# Patient Record
Sex: MALE | Race: WHITE | NOT HISPANIC OR LATINO | Employment: OTHER | URBAN - METROPOLITAN AREA
[De-identification: names, ages, dates, MRNs, and addresses within clinical notes are randomized per-mention and may not be internally consistent; named-entity substitution may affect disease eponyms.]

---

## 2022-06-28 ENCOUNTER — OUTSIDE FACILITY SERVICE (OUTPATIENT)
Dept: CARDIOLOGY | Facility: CLINIC | Age: 72
End: 2022-06-28

## 2022-06-28 PROCEDURE — 99222 1ST HOSP IP/OBS MODERATE 55: CPT | Performed by: INTERNAL MEDICINE

## 2022-08-03 NOTE — PROGRESS NOTES
Patient: Say Coley    YOB: 1950    Date: 08/04/2022    Primary Care Provider: Juan Pride MD    Chief Complaint   Patient presents with   • Cholecystitis        SUBJECTIVE:    History of present illness: Patient was seen in the emergency room about 6 weeks ago for abdominal pain.  He points to the right lower abdomen and periumbilical region.  At that time he had nausea and vomiting.  Currently still having some intermittent right lower abdominal pain some nausea but no vomiting.  The following portions of the patient's history were reviewed and updated as appropriate: allergies, current medications, past family history, past medical history, past social history, past surgical history and problem list.    Review of Systems   Constitutional: Negative for activity change, chills, fever and unexpected weight change.   HENT: Negative for hearing loss, trouble swallowing and voice change.    Eyes: Negative for visual disturbance.   Respiratory: Negative for apnea, cough, chest tightness, shortness of breath and wheezing.    Cardiovascular: Negative for chest pain, palpitations and leg swelling.   Gastrointestinal: Positive for abdominal pain and nausea. Negative for abdominal distention, anal bleeding, blood in stool, constipation, diarrhea, rectal pain and vomiting.   Endocrine: Negative for cold intolerance and heat intolerance.   Genitourinary: Negative for difficulty urinating, dysuria and flank pain.   Musculoskeletal: Negative for back pain and gait problem.   Skin: Negative for color change, rash and wound.   Neurological: Negative for dizziness, syncope, speech difficulty, weakness, light-headedness, numbness and headaches.   Hematological: Negative for adenopathy. Does not bruise/bleed easily.   Psychiatric/Behavioral: Negative for confusion. The patient is not nervous/anxious.        History:  History reviewed. No pertinent past medical history.    History reviewed. No pertinent  "surgical history.    History reviewed. No pertinent family history.    Social History     Tobacco Use   • Smoking status: Former Smoker     Packs/day: 1.50     Years: 30.00     Pack years: 45.00   • Smokeless tobacco: Never Used   Vaping Use   • Vaping Use: Never used   Substance Use Topics   • Alcohol use: Yes     Comment: Social drinker   • Drug use: Never       Allergies:  No Known Allergies    Medications:    Current Outpatient Medications:   •  amiodarone (PACERONE) 200 MG tablet, , Disp: , Rfl:   •  buPROPion XL (WELLBUTRIN XL) 150 MG 24 hr tablet, TAKE 1 TABLET BY MOUTH EVERY DAY IN THE MORNING FOR 30 DAYS, Disp: , Rfl:   •  cloNIDine (CATAPRES) 0.1 MG tablet, TAKE 1 TABLET BY MOUTH EVERY 8 HOURS AS NEEDED IF SBP > 150 30, Disp: , Rfl:   •  digoxin (LANOXIN) 250 MCG tablet, TAKE 1 TABLET BY MOUTH EVERY DAY FOR 30 DAYS, Disp: , Rfl:   •  doxazosin (CARDURA) 4 MG tablet, , Disp: , Rfl:   •  folic acid (FOLVITE) 400 MCG tablet, Take 400 mcg by mouth Daily., Disp: , Rfl:   •  HYDROcodone-acetaminophen (NORCO)  MG per tablet, TAKE 1 TABLET BY MOUTH EVERY 6 HOURS AS NEEDED FOR 30 DAYS, Disp: , Rfl:   •  Metoprolol Tartrate 75 MG tablet, Take 75 mg by mouth., Disp: , Rfl:   •  prednisoLONE acetate (PRED FORTE) 1 % ophthalmic suspension, if needed., Disp: , Rfl:   •  ramipril (ALTACE) 10 MG capsule, TAKE 1 CAPSULE BY MOUTH EVERY DAY FOR 30 DAYS, Disp: , Rfl:   •  rosuvastatin (CRESTOR) 20 MG tablet, Take 20 mg by mouth Daily., Disp: , Rfl:   •  Xarelto 20 MG tablet, , Disp: , Rfl:     OBJECTIVE:    Vital Signs:   Vitals:    08/04/22 1354   BP: 132/76   BP Location: Right arm   Patient Position: Sitting   Cuff Size: Adult   Pulse: 74   Temp: 97.4 °F (36.3 °C)   TempSrc: Temporal   SpO2: 94%   Weight: 109 kg (241 lb)   Height: 182.9 cm (72\")       Physical Exam:   General Appearance:    Alert, cooperative, in no acute distress   Head:    Normocephalic, without obvious abnormality, atraumatic   Eyes:            " Normal.  No scleral icterus.  PERRLA    Lungs:     Clear to auscultation,respirations regular, even and                  unlabored    Heart:    Regular rhythm and normal rate, normal S1 and S2, no            murmur   Abdomen:    Soft nondistended with some mild tenderness in the right lower abdomen.  No hepatosplenomegaly.   Extremities:   Moves all extremities well, no edema, no cyanosis, no             redness   Skin:   No bleeding, bruising or rash   Neurologic:   Normal without gross deficits.   Psychiatric: No evidence of depression or anxiety        Results Review:   I reviewed the patient's new clinical results.  Labs and CT were reviewed    Review of Systems was reviewed and confirmed as accurate as documented by the MA.    ASSESSMENT/PLAN:    1. Calculus of gallbladder without cholecystitis without obstruction        Certainly findings on the CT scan were suggestive of cholecystitis and although I am not convinced all of his symptoms are related to the gallbladder I do believe he is having continued issues.  I recommend a cholecystectomy.  I offered the patient a laparoscopic cholecystectomy.  I explained this procedure to them in detail and they understand the procedure.  They also understand the risks of bleeding, infection, bile leak, ductal injury, bowel injury, the possibility of converting to an open procedure etc. They understand all of these risks and I have answered all of their questions and they wish to proceed.    Electronically signed by Trevor Hassan MD  08/04/22

## 2022-08-04 ENCOUNTER — OFFICE VISIT (OUTPATIENT)
Dept: SURGERY | Facility: CLINIC | Age: 72
End: 2022-08-04

## 2022-08-04 VITALS
SYSTOLIC BLOOD PRESSURE: 132 MMHG | BODY MASS INDEX: 32.64 KG/M2 | WEIGHT: 241 LBS | TEMPERATURE: 97.4 F | OXYGEN SATURATION: 94 % | HEART RATE: 74 BPM | HEIGHT: 72 IN | DIASTOLIC BLOOD PRESSURE: 76 MMHG

## 2022-08-04 DIAGNOSIS — K80.20 CALCULUS OF GALLBLADDER WITHOUT CHOLECYSTITIS WITHOUT OBSTRUCTION: Primary | ICD-10-CM

## 2022-08-04 PROCEDURE — 99204 OFFICE O/P NEW MOD 45 MIN: CPT | Performed by: SURGERY

## 2022-08-04 RX ORDER — ROSUVASTATIN CALCIUM 20 MG/1
20 TABLET, COATED ORAL DAILY
COMMUNITY
Start: 2022-07-13

## 2022-08-04 RX ORDER — CLONIDINE HYDROCHLORIDE 0.1 MG/1
TABLET ORAL
COMMUNITY
Start: 2022-06-02

## 2022-08-04 RX ORDER — AMIODARONE HYDROCHLORIDE 200 MG/1
100 TABLET ORAL DAILY
COMMUNITY
Start: 2022-07-21

## 2022-08-04 RX ORDER — HYDROCODONE BITARTRATE AND ACETAMINOPHEN 10; 325 MG/1; MG/1
TABLET ORAL
COMMUNITY
Start: 2022-07-08

## 2022-08-04 RX ORDER — METOPROLOL TARTRATE 75 MG/1
75 TABLET, FILM COATED ORAL
COMMUNITY
Start: 2022-06-21 | End: 2022-09-19

## 2022-08-04 RX ORDER — RAMIPRIL 10 MG/1
CAPSULE ORAL
COMMUNITY
Start: 2022-05-10

## 2022-08-04 RX ORDER — DOXAZOSIN MESYLATE 4 MG/1
4 TABLET ORAL NIGHTLY
COMMUNITY
Start: 2022-03-11

## 2022-08-04 RX ORDER — PREDNISOLONE ACETATE 10 MG/ML
SUSPENSION/ DROPS OPHTHALMIC 2 TIMES DAILY PRN
COMMUNITY
Start: 2022-03-15

## 2022-08-04 RX ORDER — BUPROPION HYDROCHLORIDE 150 MG/1
150 TABLET ORAL EVERY MORNING
COMMUNITY
Start: 2022-06-07

## 2022-08-04 RX ORDER — DIGOXIN 250 MCG
TABLET ORAL
COMMUNITY
Start: 2022-06-07

## 2022-08-04 RX ORDER — LANOLIN ALCOHOL/MO/W.PET/CERES
400 CREAM (GRAM) TOPICAL DAILY
COMMUNITY
Start: 2022-06-28

## 2022-08-04 RX ORDER — RIVAROXABAN 20 MG/1
20 TABLET, FILM COATED ORAL
COMMUNITY
Start: 2022-05-27

## 2022-08-05 ENCOUNTER — TELEPHONE (OUTPATIENT)
Dept: SURGERY | Facility: CLINIC | Age: 72
End: 2022-08-05

## 2022-08-05 NOTE — TELEPHONE ENCOUNTER
Called spoke to patient wife advised that we would have to move surgery to [place], advised date for pre admission testing as well as the time, patient was advised per dr. hercules to stop xarelto 3 days prior to surgery.

## 2022-08-05 NOTE — TELEPHONE ENCOUNTER
Contacted patient cardiologist for cardiac clearance letter prior to surgery, pending fax at this time.

## 2022-08-05 NOTE — TELEPHONE ENCOUNTER
"Pt's wife called, she stated \"I am calling to see if they figured-out where Say is going to have his surgery done and when it will be done.\"  Please call Mrs. Coley.  "

## 2022-08-09 PROBLEM — K80.20 CALCULUS OF GALLBLADDER WITHOUT CHOLECYSTITIS WITHOUT OBSTRUCTION: Status: ACTIVE | Noted: 2022-08-09

## 2022-08-09 RX ORDER — SODIUM CHLORIDE 9 MG/ML
100 INJECTION, SOLUTION INTRAVENOUS CONTINUOUS
Status: CANCELLED | OUTPATIENT
Start: 2022-08-09

## 2022-08-11 ENCOUNTER — TELEPHONE (OUTPATIENT)
Dept: SURGERY | Facility: CLINIC | Age: 72
End: 2022-08-11

## 2022-08-11 ENCOUNTER — TELEPHONE (OUTPATIENT)
Dept: PREADMISSION TESTING | Facility: HOSPITAL | Age: 72
End: 2022-08-11

## 2022-08-11 NOTE — TELEPHONE ENCOUNTER
M FOR PATIENT TO CALL AND CONFIRM THE PROCEDURE ON 08/16/2022 @ [facility].

## 2022-08-12 ENCOUNTER — PRE-ADMISSION TESTING (OUTPATIENT)
Dept: PREADMISSION TESTING | Facility: HOSPITAL | Age: 72
End: 2022-08-12

## 2022-08-12 VITALS — HEIGHT: 72 IN | BODY MASS INDEX: 33.13 KG/M2 | WEIGHT: 244.6 LBS

## 2022-08-12 LAB
ANION GAP SERPL CALCULATED.3IONS-SCNC: 10.2 MMOL/L (ref 5–15)
BUN SERPL-MCNC: 20 MG/DL (ref 8–23)
BUN/CREAT SERPL: 26 (ref 7–25)
CALCIUM SPEC-SCNC: 9.5 MG/DL (ref 8.6–10.5)
CHLORIDE SERPL-SCNC: 98 MMOL/L (ref 98–107)
CO2 SERPL-SCNC: 27.8 MMOL/L (ref 22–29)
CREAT SERPL-MCNC: 0.77 MG/DL (ref 0.76–1.27)
DEPRECATED RDW RBC AUTO: 45.7 FL (ref 37–54)
EGFRCR SERPLBLD CKD-EPI 2021: 95.7 ML/MIN/1.73
ERYTHROCYTE [DISTWIDTH] IN BLOOD BY AUTOMATED COUNT: 13.8 % (ref 12.3–15.4)
GLUCOSE SERPL-MCNC: 165 MG/DL (ref 65–99)
HCT VFR BLD AUTO: 40.1 % (ref 37.5–51)
HGB BLD-MCNC: 13.8 G/DL (ref 13–17.7)
MCH RBC QN AUTO: 31.2 PG (ref 26.6–33)
MCHC RBC AUTO-ENTMCNC: 34.4 G/DL (ref 31.5–35.7)
MCV RBC AUTO: 90.7 FL (ref 79–97)
PLATELET # BLD AUTO: 229 10*3/MM3 (ref 140–450)
PMV BLD AUTO: 10.4 FL (ref 6–12)
POTASSIUM SERPL-SCNC: 4.6 MMOL/L (ref 3.5–5.2)
QT INTERVAL: 390 MS
QTC INTERVAL: 423 MS
RBC # BLD AUTO: 4.42 10*6/MM3 (ref 4.14–5.8)
SARS-COV-2 RNA PNL SPEC NAA+PROBE: NOT DETECTED
SODIUM SERPL-SCNC: 136 MMOL/L (ref 136–145)
WBC NRBC COR # BLD: 10.79 10*3/MM3 (ref 3.4–10.8)

## 2022-08-12 PROCEDURE — 93005 ELECTROCARDIOGRAM TRACING: CPT

## 2022-08-12 PROCEDURE — 80048 BASIC METABOLIC PNL TOTAL CA: CPT

## 2022-08-12 PROCEDURE — U0004 COV-19 TEST NON-CDC HGH THRU: HCPCS

## 2022-08-12 PROCEDURE — C9803 HOPD COVID-19 SPEC COLLECT: HCPCS

## 2022-08-12 PROCEDURE — 36415 COLL VENOUS BLD VENIPUNCTURE: CPT

## 2022-08-12 PROCEDURE — 85027 COMPLETE CBC AUTOMATED: CPT

## 2022-08-12 NOTE — DISCHARGE INSTRUCTIONS
PAT PASS GIVEN/REVIEWED WITH PT.  VERBALIZED UNDERSTANDING OF THE FOLLOWING:  DO NOT EAT, DRINK, SMOKE, USE SMOKELESS TOBACCO OR CHEW GUM AFTER MIDNIGHT THE NIGHT BEFORE SURGERY.  THIS ALSO INCLUDES HARD CANDIES AND MINTS.    DO NOT SHAVE THE AREA TO BE OPERATED ON AT LEAST 48 HOURS PRIOR TO THE PROCEDURE.  DO NOT WEAR MAKE UP OR NAIL POLISH.  DO NOT LEAVE IN ANY PIERCING OR WEAR JEWELRY THE DAY OF SURGERY.      DO NOT USE ADHESIVES IF YOU WEAR DENTURES.    DO NOT WEAR EYE CONTACTS; BRING IN YOUR GLASSES.    ONLY TAKE MEDICATION THE MORNING OF YOUR PROCEDURE IF INSTRUCTED BY YOUR SURGEON WITH ENOUGH WATER TO SWALLOW THE MEDICATION.  IF YOUR SURGEON DID NOT SPECIFY WHICH MEDICATIONS TO TAKE, YOU WILL NEED TO CALL THEIR OFFICE FOR FURTHER INSTRUCTIONS AND DO AS THEY INSTRUCT.    LEAVE ANYTHING YOU CONSIDER VALUABLE AT HOME.    YOU WILL NEED TO ARRANGE FOR SOMEONE TO DRIVE YOU HOME AFTER SURGERY.  IT IS RECOMMENDED THAT YOU DO NOT DRIVE, WORK, DRINK ALCOHOL OR MAKE MAJOR DECISIONS FOR AT LEAST 24 HOURS AFTER YOUR PROCEDURE IS COMPLETE.      THE DAY OF YOUR PROCEDURE, BRING IN THE FOLLOWING IF APPLICABLE:   PICTURE ID AND INSURANCE/MEDICARE OR MEDICAID CARDS/ANY CO-PAY THAT MAY BE DUE   COPY OF ADVANCED DIRECTIVE/LIVING WILL/POWER OR    CPAP/BIPAP/INHALERS   SKIN PREP SHEET   YOUR PREADMISSION TESTING PASS (IF NOT A PHONE HISTORY)       Chlorhexidine wipes along with instruction/verification sheet given to pt.  Instructed pt to date, time, and initial the verification sheet once skin prep has been  completed, and to return to [facility] the day of the procedure.  Pt. Verbalizes understanding.   Introduction to anesthesia video viewed by pt in PAT.       COVID self-quarantine instructions reviewed with the pt.  Verbalized understanding.

## 2022-08-12 NOTE — PAT
"Called Dr. Hassan's office and spoke with Alyce Beaulieu LPN.  Informed her that I had read her note in the EMR requesting cardiac clearance for pt for upcoming procedure with Dr. Hassan.  Inquired if it was ordered by Dr Hassan.  Alyce stated that she had requested it,  just in case anesthesia wanted it.  Informed her that I would call anesthesia and inquire.        Called anesthesia phone and spoke with Jorge Luis Arthur CRNA.  Informed that pt is to have a procedure on 8/16/22 with Dr. Hassan.  Reviewed PMH with BRITTNEY.  Pt had a pacemaker placed on 1/14/22 at .  Reviewed last clinic note \"disscussion summary\" and pacemaker interrogation from Dr. Perez on 4/21/22 (cardio).  Pt reports that he returns in October for a f/u.  Pt had a stress test on 7/1/22 and reviewed results with CRNA.  Also reviewed preliminary EKG interpretation of \"atrial-paced rhythm with prolonged AV conduction.  ST and T wave abnormality, consider anterolateral ischemia.  Abnormal EKG\" with Jorge Luis.  Pt states that he has intermittent chest pain since his pacemaker was placed, and happens when he lies down.  The pt's wife states that the cardiologist is aware, and that the pt was told to take an antacid, which he does (Tums), and reports that it helps.  Last episode was two days ago.  Pt's wife states that he helps clean the house, and works in the yard without difficulty.  No other exercise noted.  Jorge Luis stated that pt may proceed with surgery.    "

## 2022-08-16 ENCOUNTER — ANESTHESIA (OUTPATIENT)
Dept: PERIOP | Facility: HOSPITAL | Age: 72
End: 2022-08-16

## 2022-08-16 ENCOUNTER — APPOINTMENT (OUTPATIENT)
Dept: GENERAL RADIOLOGY | Facility: HOSPITAL | Age: 72
End: 2022-08-16

## 2022-08-16 ENCOUNTER — HOSPITAL ENCOUNTER (OUTPATIENT)
Facility: HOSPITAL | Age: 72
Setting detail: HOSPITAL OUTPATIENT SURGERY
Discharge: HOME | End: 2022-08-16
Attending: SURGERY | Admitting: SURGERY

## 2022-08-16 ENCOUNTER — ANESTHESIA EVENT (OUTPATIENT)
Dept: PERIOP | Facility: HOSPITAL | Age: 72
End: 2022-08-16

## 2022-08-16 VITALS
RESPIRATION RATE: 20 BRPM | TEMPERATURE: 97.2 F | DIASTOLIC BLOOD PRESSURE: 70 MMHG | OXYGEN SATURATION: 94 % | HEART RATE: 72 BPM | SYSTOLIC BLOOD PRESSURE: 99 MMHG

## 2022-08-16 DIAGNOSIS — K80.20 CALCULUS OF GALLBLADDER WITHOUT CHOLECYSTITIS WITHOUT OBSTRUCTION: ICD-10-CM

## 2022-08-16 PROCEDURE — 0 AMPICILLIN-SULBACTAM PER 1.5 G: Performed by: SURGERY

## 2022-08-16 PROCEDURE — 25010000002 IOPAMIDOL 61 % SOLUTION: Performed by: SURGERY

## 2022-08-16 PROCEDURE — 25010000002 PROPOFOL 200 MG/20ML EMULSION: Performed by: NURSE ANESTHETIST, CERTIFIED REGISTERED

## 2022-08-16 PROCEDURE — 74300 X-RAY BILE DUCTS/PANCREAS: CPT

## 2022-08-16 PROCEDURE — 25010000002 HYDROMORPHONE 1 MG/ML SOLUTION: Performed by: NURSE ANESTHETIST, CERTIFIED REGISTERED

## 2022-08-16 PROCEDURE — 47563 LAPARO CHOLECYSTECTOMY/GRAPH: CPT | Performed by: SURGERY

## 2022-08-16 PROCEDURE — 88304 TISSUE EXAM BY PATHOLOGIST: CPT

## 2022-08-16 PROCEDURE — 25010000002 FENTANYL CITRATE (PF) 100 MCG/2ML SOLUTION: Performed by: NURSE ANESTHETIST, CERTIFIED REGISTERED

## 2022-08-16 DEVICE — LIGAMAX 5 MM ENDOSCOPIC MULTIPLE CLIP APPLIER
Type: IMPLANTABLE DEVICE | Site: ABDOMEN | Status: FUNCTIONAL
Brand: LIGAMAX

## 2022-08-16 RX ORDER — ROCURONIUM BROMIDE 10 MG/ML
INJECTION, SOLUTION INTRAVENOUS AS NEEDED
Status: DISCONTINUED | OUTPATIENT
Start: 2022-08-16 | End: 2022-08-16 | Stop reason: SURG

## 2022-08-16 RX ORDER — ONDANSETRON 2 MG/ML
4 INJECTION INTRAMUSCULAR; INTRAVENOUS ONCE AS NEEDED
Status: DISCONTINUED | OUTPATIENT
Start: 2022-08-16 | End: 2022-08-16 | Stop reason: HOSPADM

## 2022-08-16 RX ORDER — ONDANSETRON 4 MG/1
4 TABLET, FILM COATED ORAL EVERY 8 HOURS PRN
Qty: 8 TABLET | Refills: 1 | Status: SHIPPED | OUTPATIENT
Start: 2022-08-16 | End: 2022-08-19

## 2022-08-16 RX ORDER — IBUPROFEN 600 MG/1
600 TABLET ORAL EVERY 6 HOURS PRN
Qty: 14 TABLET | Refills: 0 | Status: SHIPPED | OUTPATIENT
Start: 2022-08-16 | End: 2022-08-19

## 2022-08-16 RX ORDER — AMPICILLIN AND SULBACTAM 2; 1 G/1; G/1
3 INJECTION, POWDER, FOR SOLUTION INTRAMUSCULAR; INTRAVENOUS ONCE
Status: DISCONTINUED | OUTPATIENT
Start: 2022-08-16 | End: 2022-08-16

## 2022-08-16 RX ORDER — FENTANYL CITRATE 50 UG/ML
INJECTION, SOLUTION INTRAMUSCULAR; INTRAVENOUS AS NEEDED
Status: DISCONTINUED | OUTPATIENT
Start: 2022-08-16 | End: 2022-08-16 | Stop reason: SURG

## 2022-08-16 RX ORDER — BUPIVACAINE HYDROCHLORIDE AND EPINEPHRINE 2.5; 5 MG/ML; UG/ML
INJECTION, SOLUTION EPIDURAL; INFILTRATION; INTRACAUDAL; PERINEURAL AS NEEDED
Status: DISCONTINUED | OUTPATIENT
Start: 2022-08-16 | End: 2022-08-16 | Stop reason: HOSPADM

## 2022-08-16 RX ORDER — PROPOFOL 10 MG/ML
INJECTION, EMULSION INTRAVENOUS AS NEEDED
Status: DISCONTINUED | OUTPATIENT
Start: 2022-08-16 | End: 2022-08-16 | Stop reason: SURG

## 2022-08-16 RX ORDER — LIDOCAINE HYDROCHLORIDE 20 MG/ML
INJECTION, SOLUTION INTRAVENOUS AS NEEDED
Status: DISCONTINUED | OUTPATIENT
Start: 2022-08-16 | End: 2022-08-16 | Stop reason: SURG

## 2022-08-16 RX ORDER — 0.9 % SODIUM CHLORIDE 0.9 %
VIAL (ML) INJECTION AS NEEDED
Status: DISCONTINUED | OUTPATIENT
Start: 2022-08-16 | End: 2022-08-16 | Stop reason: HOSPADM

## 2022-08-16 RX ORDER — SODIUM CHLORIDE 9 MG/ML
100 INJECTION, SOLUTION INTRAVENOUS CONTINUOUS
Status: DISCONTINUED | OUTPATIENT
Start: 2022-08-16 | End: 2022-08-16 | Stop reason: HOSPADM

## 2022-08-16 RX ORDER — KETAMINE HCL IN NACL, ISO-OSM 100MG/10ML
SYRINGE (ML) INJECTION AS NEEDED
Status: DISCONTINUED | OUTPATIENT
Start: 2022-08-16 | End: 2022-08-16 | Stop reason: SURG

## 2022-08-16 RX ADMIN — HYDROMORPHONE HYDROCHLORIDE 0.5 MG: 1 INJECTION, SOLUTION INTRAMUSCULAR; INTRAVENOUS; SUBCUTANEOUS at 17:08

## 2022-08-16 RX ADMIN — ROCURONIUM BROMIDE 10 MG: 10 INJECTION INTRAVENOUS at 15:00

## 2022-08-16 RX ADMIN — PROPOFOL 120 MG: 10 INJECTION, EMULSION INTRAVENOUS at 13:59

## 2022-08-16 RX ADMIN — AMPICILLIN SODIUM AND SULBACTAM SODIUM 3 G: 2; 1 INJECTION, POWDER, FOR SOLUTION INTRAVENOUS at 16:00

## 2022-08-16 RX ADMIN — SODIUM CHLORIDE: 9 INJECTION, SOLUTION INTRAVENOUS at 15:09

## 2022-08-16 RX ADMIN — ROCURONIUM BROMIDE 30 MG: 10 INJECTION INTRAVENOUS at 13:59

## 2022-08-16 RX ADMIN — AMPICILLIN SODIUM AND SULBACTAM SODIUM 3 G: 2; 1 INJECTION, POWDER, FOR SOLUTION INTRAVENOUS at 13:54

## 2022-08-16 RX ADMIN — LIDOCAINE HYDROCHLORIDE 100 MG: 20 INJECTION, SOLUTION INTRAVENOUS at 13:59

## 2022-08-16 RX ADMIN — FENTANYL CITRATE 50 MCG: 50 INJECTION INTRAMUSCULAR; INTRAVENOUS at 14:10

## 2022-08-16 RX ADMIN — Medication 15 MG: at 15:09

## 2022-08-16 RX ADMIN — FENTANYL CITRATE 50 MCG: 50 INJECTION INTRAMUSCULAR; INTRAVENOUS at 13:59

## 2022-08-16 RX ADMIN — ROCURONIUM BROMIDE 20 MG: 10 INJECTION INTRAVENOUS at 15:25

## 2022-08-16 RX ADMIN — SODIUM CHLORIDE 100 ML/HR: 9 INJECTION, SOLUTION INTRAVENOUS at 13:30

## 2022-08-16 NOTE — ANESTHESIA PREPROCEDURE EVALUATION
" Anesthesia Evaluation     Patient summary reviewed and Nursing notes reviewed   NPO Solid Status: > 8 hours  NPO Liquid Status: > 8 hours           Airway   Mallampati: II  TM distance: >3 FB  Possible difficult intubation  Dental - normal exam     Pulmonary - normal exam   (+) a smoker Former, asthma,sleep apnea,   Cardiovascular - normal exam  Exercise tolerance: good (4-7 METS)    ECG reviewed  PT is on anticoagulation therapy  Patient on routine beta blocker    (+) pacemaker ICD, pacemaker interrogated 3-6 months ago, hypertension, dysrhythmias Atrial Fib, hyperlipidemia,       Neuro/Psych  (+) psychiatric history Anxiety and Depression,    GI/Hepatic/Renal/Endo    (+) obesity,       Musculoskeletal     Abdominal   (+) obese,    Substance History      OB/GYN negative ob/gyn ROS         Other   arthritis,      ROS/Med Hx Other: Nuclear stress test 7/1/2022:  \"Normal stress test\".                  Anesthesia Plan    ASA 3     general     (Risks and benefits discussed including risk of aspiration, recall and dental damage. All patient questions answered.    Will continue with plan of care.)  intravenous induction     Anesthetic plan, risks, benefits, and alternatives have been provided, discussed and informed consent has been obtained with: patient.  Pre-procedure education provided  Plan discussed with CRNA.        CODE STATUS:       "

## 2022-08-16 NOTE — OP NOTE
PATIENT:     Say Coley    DATE OF SURGERY:     8/16/2022    PHYSICIAN:   Trevor Hassan MD    REFERRING PHYSICIAN:  Juan Pride MD     YOB: 1950    PREOPERATIVE DIAGNOSIS:  Chronic cholecystitis due to Cholelithiasis    POSTOPERATIVE DIAGNOSIS:  Chronic cholecystitis due to Cholelithiasis    PROCEDURE:  Laparoscopic cholecystectomy with intraoperative cholangiography.    ANESTHESIA:  General endotracheal.    EBL: Less than 30 mL    Specimen: Gallbladder    Complications: None    First assist: Dr. Em who was present for the cholecystectomy and cholangiogram.    OPERATIVE PROCEDURE:  The patient was taken to the operating room, placed in the supine position, and given general endotracheal anesthesia.  The patient was prepped and draped in the normal sterile fashion, and also received preoperative IV antibiotics.  An appropriate timeout was performed by the nursing staff prior to the incision.     Local anesthetic with Marcaine 0.5% was infused locally at all incision sites. An infraumbilical incision was then made to insert a 5 mm Optiview into the peritoneal cavity and the peritoneal cavity was insufflated with carbon dioxide.  This was later replaced for an 11 mm port.  An epigastric and 2 right upper quadrant 5 mm ports were then also placed.    Patient had dense adhesions overlying the gallbladder to omentum.  These were dissected free sharply and bluntly.  It appeared that the patient had a chronic abscess to the fundus of the gallbladder.  It was small and it was resected.  This did not appear to be connected to bowel.  Ultimately the gallbladder was unroofed and dissection was continued more distally.  Patient had a very firm gallbladder consistent with chronic cholecystitis of longstanding duration.  What I felt was infundibulum at first was in fact a significantly dilated common bile duct.  I had also asked Dr. Em for assistance due to the difficulty of dissection.  It  appeared that a stone was in the common bile duct and was milked proximally into the gallbladder.  More proximal dissection was then performed and a cholecystotomy was made since I was not clearly able to identify the cystic duct and cholangiogram was performed.  On the cholangiogram it was apparent that I was just above the cystic duct and dissection was performed at that location and ultimately the cystic duct circumferentially dissected free and was quite short.  Cystic artery at this point was also identified and doubly clipped distally and singly proximally.  A cystic ductotomy was then made and the cholangiogram catheter inserted here and another cholangiogram performed.  This confirmed that I was in the cystic duct which was also quite dilated.  At this point  I did not see an obvious common bile duct stone or other anatomical abnormality other than for ductal dilation and the cholangiogram catheter removed.  Cystic duct was then divided completely since it was unable to be clipped and ultimately was ligated with a PDS Endoloop.  Cystic artery was divided.  L-hook cautery was used to dissect free the gallbladder from the adjacent liver bed until the gallbladder is completely removed through the umbilical port site using an Endo Catch bag.  A Jack-Beasley drain was then placed in the gallbladder fossa through the right upper quadrant port site and anchored in place with a silk suture ligature.    Copious irrigation was used in the patient’s abdominal cavity.  It was clean and dry at this point.  Hemostasis had been well controlled.  Hemostasis was intact and there was no evidence of bilious leakage. Trocars were removed under direct vision and the skin was closed with 4-0 PDS subcuticular stitch along with Steri-Strips for skin reapproximation.  Umbilical fascia had already been reapproximated using a figure-of-eight Vicryl closure.  There were no complications.    The patient was stable at this point and  subsequently transferred back to the recovery room in stable condition.    Trevor Hassan MD    8/16/2022    17:08 EDT

## 2022-08-16 NOTE — ANESTHESIA POSTPROCEDURE EVALUATION
Patient: Say Coley    Procedure Summary     Date: 08/16/22 Room / Location: [facility] OR 4 /  [facility]    Anesthesia Start: 1354 Anesthesia Stop: 1700    Procedure: CHOLECYSTECTOMY LAPAROSCOPIC INTRAOPERATIVE CHOLANGIOGRAPHY WITH COMMON BILE DUCT DISSECTION (N/A Abdomen) Diagnosis:       Calculus of gallbladder without cholecystitis without obstruction      (Calculus of gallbladder without cholecystitis without obstruction [K80.20])    Surgeons: Trevor Hassan MD Provider: Jordan Lopez CRNA    Anesthesia Type: general ASA Status: 3          Anesthesia Type: general    Vitals  Vitals Value Taken Time   /70 08/16/22 1702   Temp     Pulse 70 08/16/22 1706   Resp     SpO2 95 % 08/16/22 1705   Vitals shown include unvalidated device data.        Post Anesthesia Care and Evaluation    Patient location during evaluation: PACU  Patient participation: complete - patient participated  Level of consciousness: awake and alert  Pain score: 1  Pain management: adequate    Airway patency: patent  Anesthetic complications: No anesthetic complications  PONV Status: none  Cardiovascular status: acceptable  Respiratory status: acceptable  Hydration status: acceptable  No anesthesia care post op

## 2022-08-16 NOTE — ANESTHESIA PROCEDURE NOTES
Airway  Urgency: elective    Date/Time: 8/16/2022 2:02 PM  Airway not difficult    General Information and Staff    Patient location during procedure: OR  CRNA/CAA: Jordan Lopez CRNA    Indications and Patient Condition  Indications for airway management: airway protection    Preoxygenated: yes  Mask difficulty assessment: 1 - vent by mask    Final Airway Details  Final airway type: endotracheal airway      Successful airway: ETT  Cuffed: yes   Successful intubation technique: direct laryngoscopy  Endotracheal tube insertion site: oral  Blade: Cruz  Blade size: 2  ETT size (mm): 7.0  Cormack-Lehane Classification: grade I - full view of glottis  Placement verified by: chest auscultation and capnometry   Measured from: lips  ETT/EBT  to lips (cm): 23  Number of attempts at approach: 1  Assessment: lips, teeth, and gum same as pre-op and atraumatic intubation

## 2022-08-18 LAB — REF LAB TEST METHOD: NORMAL

## 2022-08-19 PROBLEM — Z86.79 S/P ABLATION OF ATRIAL FIBRILLATION: Status: ACTIVE | Noted: 2022-05-03

## 2022-08-19 PROBLEM — Z95.0 CARDIAC PACEMAKER IN SITU: Status: ACTIVE | Noted: 2022-05-03

## 2022-08-19 PROBLEM — Z79.899 HIGH RISK MEDICATION USE: Status: ACTIVE | Noted: 2022-05-03

## 2022-08-19 PROBLEM — Z79.01 CHRONIC ANTICOAGULATION: Status: ACTIVE | Noted: 2022-05-03

## 2022-08-19 PROBLEM — I49.3 PVC (PREMATURE VENTRICULAR CONTRACTION): Status: ACTIVE | Noted: 2018-02-05

## 2022-08-19 PROBLEM — I48.0 PAROXYSMAL ATRIAL FIBRILLATION: Status: ACTIVE | Noted: 2021-08-19

## 2022-08-19 PROBLEM — Z98.890 S/P ABLATION OF ATRIAL FIBRILLATION: Status: ACTIVE | Noted: 2022-05-03

## 2022-08-19 PROBLEM — G47.19 EXCESSIVE DAYTIME SLEEPINESS: Status: ACTIVE | Noted: 2019-01-16

## 2022-08-19 PROBLEM — R00.0 TACHYCARDIA: Status: ACTIVE | Noted: 2022-05-03

## 2022-08-19 PROBLEM — I48.92 ATRIAL FLUTTER: Status: ACTIVE | Noted: 2019-09-16

## 2022-08-19 PROBLEM — I10 ESSENTIAL HYPERTENSION: Status: ACTIVE | Noted: 2021-08-19

## 2022-08-19 PROBLEM — I49.8 VENTRICULAR BIGEMINY: Status: ACTIVE | Noted: 2018-01-22

## 2022-08-19 PROBLEM — D64.9 ANEMIA: Status: ACTIVE | Noted: 2020-08-20

## 2022-08-19 RX ORDER — GABAPENTIN 600 MG/1
TABLET ORAL
COMMUNITY
Start: 2022-08-06

## 2022-08-19 NOTE — PROGRESS NOTES
"Patient: Say Coley    YOB: 1950    Date: 08/22/2022    Primary Care Provider: Juan Pride MD    Chief Complaint   Patient presents with   • Post-op     Lap flora       History of present illness:  I saw the patient in the office today as a followup from their recent laparoscopic cholecystectomy.  They state that they have done well are having some constipation since surgery and some mild pain, unsure if it is from the drain at this time causing the pain.     Vital Signs:   Vitals:    08/22/22 1316   BP: 109/78   Pulse: 74   Temp: 98.1 °F (36.7 °C)   SpO2: 98%   Weight: 110 kg (241 lb 12.8 oz)   Height: 182.9 cm (72\")       Physical Exam:   General Appearance:    Alert, cooperative, in no acute distress.  Sclera anicteric   Abdomen:     no masses, no organomegaly, soft non-tender, non-distended, no guarding, wounds are well healed.  Drain with minimal serous drainage and this was removed.     Assessment / Plan :    1. Postoperative visit        Overall doing well.  Abdominal exam completely benign.  I have told him to stop any further narcotic use since I think this is causing some constipation.  He can try some milk of magnesia today to help.  I gave him activity instructions.  Wound care instructions were also given.  He will follow-up with me if he has any further issues.    Electronically signed by Trevor Hassan MD  08/22/22                  "

## 2022-08-22 ENCOUNTER — OFFICE VISIT (OUTPATIENT)
Dept: SURGERY | Facility: CLINIC | Age: 72
End: 2022-08-22

## 2022-08-22 VITALS
HEIGHT: 72 IN | OXYGEN SATURATION: 98 % | SYSTOLIC BLOOD PRESSURE: 109 MMHG | DIASTOLIC BLOOD PRESSURE: 78 MMHG | HEART RATE: 74 BPM | TEMPERATURE: 98.1 F | BODY MASS INDEX: 32.75 KG/M2 | WEIGHT: 241.8 LBS

## 2022-08-22 DIAGNOSIS — Z48.89 POSTOPERATIVE VISIT: Primary | ICD-10-CM

## 2022-08-22 PROCEDURE — 99024 POSTOP FOLLOW-UP VISIT: CPT | Performed by: SURGERY

## (undated) DEVICE — TP ELECTRD LAP L WR SPLIT33CM

## (undated) DEVICE — RESERVOIR,SUCTION,100CC,SILICONE: Brand: MEDLINE

## (undated) DEVICE — PDS II VLT 0 107CM AG ST3: Brand: ENDOLOOP

## (undated) DEVICE — DRN JP FLT NO TROC SIL FUL/PERF 7MM

## (undated) DEVICE — RICH GENERAL LAPAROSCOPY: Brand: MEDLINE INDUSTRIES, INC.

## (undated) DEVICE — FLTR PLUMEPORT LAP W/CONN STRL

## (undated) DEVICE — GLV SURG SENSICARE W/ALOE PF LF 7.5 STRL

## (undated) DEVICE — SOL IRR NACL 0.9PCT BT 1000ML

## (undated) DEVICE — DISPOSABLE MONOPOLAR ENDOSCOPIC CORD 10 FT. (3M): Brand: KIRWAN

## (undated) DEVICE — SLV SCD CALF HEMOFORCE DVT THERP REPROC MD

## (undated) DEVICE — SOL NACL 0.9PCT 1000ML

## (undated) DEVICE — 2, DISPOSABLE SUCTION/IRRIGATOR WITHOUT DISPOSABLE TIP: Brand: STRYKEFLOW

## (undated) DEVICE — LAPAROSCOPIC DISSECTOR: Brand: DEROYAL

## (undated) DEVICE — UNDYED MONOFILAMENT (POLYDIOXANONE), ABSORBABLE SYNTHETIC SURGICAL SUTURE: Brand: PDS

## (undated) DEVICE — CLAVICLE STRAP: Brand: DEROYAL

## (undated) DEVICE — MONOPOLAR METZENBAUM SCISSOR, MINI BLADE TIP, DISPOSABLE: Brand: MONOPOLAR METZENBAUM SCISSOR, MINI BLADE TIP, DISPOSABLE

## (undated) DEVICE — KT CATH CHOLANGIOGRA PERC W/BALLO

## (undated) DEVICE — ENDOPATH XCEL BLADELESS TROCARS WITH STABILITY SLEEVES: Brand: ENDOPATH XCEL

## (undated) DEVICE — ENDOPATH XCEL UNIVERSAL TROCAR STABLILITY SLEEVES: Brand: ENDOPATH XCEL